# Patient Record
(demographics unavailable — no encounter records)

---

## 2025-06-02 NOTE — PHYSICAL EXAM
[Chaperoned Physical Exam] : A chaperone was present in the examining room during all aspects of the physical examination. [Appropriately responsive] : appropriately responsive [Alert] : alert [No Acute Distress] : no acute distress [No Lymphadenopathy] : no lymphadenopathy [Regular Rate Rhythm] : regular rate rhythm [No Murmurs] : no murmurs [Clear to Auscultation B/L] : clear to auscultation bilaterally [Soft] : soft [Non-tender] : non-tender [Non-distended] : non-distended [No HSM] : No HSM [No Lesions] : no lesions [No Mass] : no mass [Oriented x3] : oriented x3 [Examination Of The Breasts] : a normal appearance [No Masses] : no breast masses were palpable [Labia Majora] : normal [Labia Minora] : normal [Normal] : normal [Uterine Adnexae] : normal [FreeTextEntry2] : Esme Rosales (scribe)

## 2025-06-02 NOTE — PLAN
[FreeTextEntry1] : 16 year old female pt presents for tampon insertion.  Tampon Consultation: Taught pt how to use tampon-- was placed in by me 4 times.  The applicator was different than usual tampon so didn't deploy iinitially   Pt unable to put in herself and felt very irritated and told to try at home with mirror and lubrication.  Pt told if unable to do herself will make another appt.  >40 min attempted and instructions given to patient and mom and option of [facility] d/w pt's mom.  RTO in  PRN  I Esme am scribing for and in the presence of EUGENIA Meredith M.D. in the following sections: HISTORY OF PRESENT ILLNESS, PAST MEDICAL/FAMILY/SOCIAL HISTORY, REVIEW OF SYSTEMS, PHYSICAL EXAM, ASSESSMENT/PLAN.

## 2025-06-02 NOTE — HISTORY OF PRESENT ILLNESS
[FreeTextEntry1] : 06/02/2025. ANI SEO 16 year old female G0 LMP 5/27/25 presents attempt at placing tampong   She feels well and offers no complaints. She has normal menses, not too heavy or painful. She denies intermenstrual bleeding, abn discharge or vaginitis sxs. No urinary complaints. She has normal BM, no bloody stool. She denies abdominal or pelvic pain.  Pt reports she wants to learn how to put a tampon on.  Pt going away for [place] in summer.   PMH: generalized anxiety, ADHD OBGYN: denies SHx: ear surgery Med: Fluoxetine, Clonidine All: NKDA S/p Gardasil vaccine x3 PHQ9=1